# Patient Record
(demographics unavailable — no encounter records)

---

## 2025-05-21 NOTE — HISTORY OF PRESENT ILLNESS
[FreeTextEntry1] : CASSIDY MARION 31 YO, presents for Annual & delayed menses G 1 LMP: 03/22/25 - Irregular menses UCG Positive @ Home on 05/01/25 Medication: Folic Acid No family history of breast cancer. To do monthly SBE.  No health issues, nonsmoker, NKDA.  For dating sonogram today.

## 2025-05-21 NOTE — PROCEDURE
[Cervical Pap Smear] : cervical Pap smear [Liquid Base] : liquid base [GC & Chlamydia via Pap] : GC & Chlamydia via Pap [Tolerated Well] : the patient tolerated the procedure well [No Complications] : there were no complications [Transvaginal OB Sonogram] : Transvaginal OB Sonogram [Intrauterine Pregnancy] : intrauterine pregnancy [Yolk Sac] : yolk sac present [Fetal Heart] : fetal heart present [Date: ___] : Date: [unfilled] [Current GA by Sonogram: ___ (wks)] : Current GA by Sonogram: [unfilled]Uwks [___ day(s)] : [unfilled] days [Transvaginal OB Sonogram WNL] : Transvaginal OB Sonogram WNL [FreeTextEntry1] : Viable SIUP @ 7.1 weeks.  ISIDORO- 01/06/26

## 2025-06-18 NOTE — HISTORY OF PRESENT ILLNESS
[FreeTextEntry1] : CASSIDY MARION 33 YO, presents for Sonogram, results & blood work. G 1 LMP: 03/22/25 - Irregular menses Medication: Folic Acid PAP- WNL, NG & CT- Not detected.  HIV Counseling done & blood drawn for PNP & NIPS.  For NT @ [facility] by 07/03/25. Obesity & weight gain discussion.

## 2025-06-18 NOTE — PROCEDURE
[Transabdominal OB Sonogram] : Transabdominal OB Sonogram [Intrauterine Pregnancy] : intrauterine pregnancy [Fetal Heart] : fetal heart present [Date: ___] : Date: [unfilled] [Current GA by Sonogram: ___ (wks)] : Current GA by Sonogram: [unfilled]Uwks [___ day(s)] : [unfilled] days [Transabdominal OB Sonogram WNL] : Transabdominal OB Sonogram WNL [FreeTextEntry1] : Viable SIUP @ 11.5 weeks.  ISIDORO- 01/02/26.